# Patient Record
Sex: FEMALE | Race: WHITE | NOT HISPANIC OR LATINO | Employment: STUDENT | URBAN - METROPOLITAN AREA
[De-identification: names, ages, dates, MRNs, and addresses within clinical notes are randomized per-mention and may not be internally consistent; named-entity substitution may affect disease eponyms.]

---

## 2018-04-25 ENCOUNTER — OFFICE VISIT (OUTPATIENT)
Dept: INTERNAL MEDICINE | Facility: CLINIC | Age: 23
End: 2018-04-25
Attending: INTERNAL MEDICINE
Payer: COMMERCIAL

## 2018-04-25 VITALS
HEART RATE: 59 BPM | SYSTOLIC BLOOD PRESSURE: 108 MMHG | OXYGEN SATURATION: 98 % | HEIGHT: 65 IN | WEIGHT: 138.69 LBS | DIASTOLIC BLOOD PRESSURE: 70 MMHG | BODY MASS INDEX: 23.11 KG/M2

## 2018-04-25 DIAGNOSIS — Z76.89 ESTABLISHING CARE WITH NEW DOCTOR, ENCOUNTER FOR: Primary | ICD-10-CM

## 2018-04-25 DIAGNOSIS — J06.9 VIRAL UPPER RESPIRATORY TRACT INFECTION: ICD-10-CM

## 2018-04-25 PROCEDURE — 99385 PREV VISIT NEW AGE 18-39: CPT | Mod: S$GLB,,, | Performed by: INTERNAL MEDICINE

## 2018-04-25 PROCEDURE — 99999 PR PBB SHADOW E&M-EST. PATIENT-LVL III: CPT | Mod: PBBFAC,,, | Performed by: INTERNAL MEDICINE

## 2018-04-25 RX ORDER — NORETHINDRONE ACETATE AND ETHINYL ESTRADIOL, ETHINYL ESTRADIOL AND FERROUS FUMARATE 1MG-10(24)
KIT ORAL
COMMUNITY
Start: 2018-04-16

## 2018-04-25 NOTE — PROGRESS NOTES
"Subjective:       Patient ID: Veronica Fontenot is a 23 y.o. female.    Chief Complaint: Establish Care    Here for urgent visit    1 week ago developed f/c, aches in neck and back, bad sore throat, Started on a course of prednisone. Symptoms are improving.Patient denies F/C, SOB, eye pain, severe HA, or inability to maintain adequate PO intake.          Review of Systems   Constitutional: Negative for chills, fatigue, fever and unexpected weight change.   HENT: Positive for postnasal drip, sinus pressure and sore throat. Negative for ear pain, hearing loss and voice change.    Respiratory: Negative for chest tightness, shortness of breath and wheezing.    Cardiovascular: Negative for chest pain, palpitations and leg swelling.   Gastrointestinal: Negative for abdominal pain, blood in stool, diarrhea, nausea and vomiting.   Endocrine: Negative for polydipsia, polyphagia and polyuria.   Genitourinary: Negative for difficulty urinating, dysuria, hematuria and vaginal bleeding.   Skin: Negative for rash.   Allergic/Immunologic: Negative for food allergies.   Neurological: Negative for dizziness, numbness and headaches.   Hematological: Does not bruise/bleed easily.   Psychiatric/Behavioral: The patient is not nervous/anxious.        Objective:      Vitals:    04/25/18 1128   BP: 108/70   Pulse: (!) 59   SpO2: 98%   Weight: 62.9 kg (138 lb 10.7 oz)   Height: 5' 5" (1.651 m)      Physical Exam   Constitutional: She is oriented to person, place, and time. She appears well-developed and well-nourished. No distress.   HENT:   Head: Normocephalic and atraumatic.   Right Ear: Tympanic membrane, external ear and ear canal normal.   Left Ear: Tympanic membrane, external ear and ear canal normal.   Nose: No mucosal edema or rhinorrhea.   Mouth/Throat: Posterior oropharyngeal edema (mild) present. No oropharyngeal exudate, posterior oropharyngeal erythema or tonsillar abscesses.   Eyes: Conjunctivae and EOM are normal. Pupils are " equal, round, and reactive to light. No scleral icterus.   Neck: No thyromegaly present.   Cardiovascular: Normal rate, regular rhythm and normal heart sounds.    No murmur heard.  Pulmonary/Chest: Effort normal and breath sounds normal. No respiratory distress. She has no wheezes. She has no rales.   Abdominal: Soft. She exhibits no distension. There is no tenderness.   Musculoskeletal: She exhibits no edema or tenderness.   Lymphadenopathy:     She has no cervical adenopathy.   Neurological: She is alert and oriented to person, place, and time.   Skin: Skin is warm and dry. No rash noted.   Psychiatric: She has a normal mood and affect. Her behavior is normal.       Assessment:       1. Establishing care with new doctor, encounter for    2. Viral upper respiratory tract infection        Plan:       Veronica was seen today for establish care.    Diagnoses and all orders for this visit:    Establishing care with new doctor, encounter for  Health Maintenance reviewed. Pt not interested in labs at this time. Vaccines discussed. Well woman exams discussed.    Viral upper respiratory tract infection  Resolving. Office and Emergency Department prompts discussed.               Side effects of medication(s) were discussed in detail and patient voiced understanding.  Patient will call back for any issues or complications.